# Patient Record
Sex: MALE | Race: WHITE
[De-identification: names, ages, dates, MRNs, and addresses within clinical notes are randomized per-mention and may not be internally consistent; named-entity substitution may affect disease eponyms.]

---
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
## 2019-01-02 ENCOUNTER — APPOINTMENT (OUTPATIENT)
Dept: PHARMACY | Facility: CLINIC | Age: 19
End: 2019-01-02

## 2019-07-22 ENCOUNTER — APPOINTMENT (OUTPATIENT)
Dept: PHARMACY | Facility: CLINIC | Age: 19
End: 2019-07-22
Payer: SELF-PAY

## 2019-07-22 PROCEDURE — V5014I: CUSTOM

## 2019-08-08 ENCOUNTER — HOSPITAL ENCOUNTER (EMERGENCY)
Facility: HOSPITAL | Age: 19
Discharge: HOME | End: 2019-08-08
Attending: EMERGENCY MEDICINE | Admitting: EMERGENCY MEDICINE
Payer: COMMERCIAL

## 2019-08-08 VITALS
HEART RATE: 72 BPM | SYSTOLIC BLOOD PRESSURE: 129 MMHG | DIASTOLIC BLOOD PRESSURE: 66 MMHG | WEIGHT: 200.18 LBS | TEMPERATURE: 98.3 F | BODY MASS INDEX: 30.34 KG/M2 | RESPIRATION RATE: 16 BRPM | OXYGEN SATURATION: 97 % | HEIGHT: 68 IN

## 2019-08-08 DIAGNOSIS — T78.40XA ACUTE ALLERGIC REACTION: Primary | ICD-10-CM

## 2019-08-08 PROCEDURE — 99284 EMERGENCY DEPT VISIT MOD MDM: CPT

## 2019-08-08 PROCEDURE — 99284 EMERGENCY DEPT VISIT MOD MDM: CPT | Performed by: PHYSICIAN ASSISTANT

## 2019-08-08 RX ORDER — PREDNISONE 20 MG/1
60 TABLET ORAL DAILY
Qty: 12 TABLET | Refills: 0 | Status: SHIPPED | OUTPATIENT
Start: 2019-08-08 | End: 2019-08-08 | Stop reason: SDUPTHER

## 2019-08-08 RX ORDER — EPINEPHRINE 0.3 MG/.3ML
0.3 INJECTION SUBCUTANEOUS ONCE
Qty: 1 EACH | Refills: 0 | Status: SHIPPED | OUTPATIENT
Start: 2019-08-08 | End: 2019-08-08 | Stop reason: SDUPTHER

## 2019-08-08 RX ORDER — PREDNISONE 20 MG/1
60 TABLET ORAL DAILY
Qty: 12 TABLET | Refills: 0 | Status: SHIPPED | OUTPATIENT
Start: 2019-08-08 | End: 2019-08-12

## 2019-08-08 RX ORDER — OXYMETAZOLINE HYDROCHLORIDE 0.05 G/100ML
2 SPRAY NASAL ONCE
Status: COMPLETED | OUTPATIENT
Start: 2019-08-08 | End: 2019-08-08

## 2019-08-08 RX ORDER — EPINEPHRINE 0.3 MG/.3ML
0.3 INJECTION SUBCUTANEOUS ONCE
Qty: 1 EACH | Refills: 0 | Status: SHIPPED | OUTPATIENT
Start: 2019-08-08 | End: 2019-08-08

## 2019-08-08 RX ADMIN — OXYMETAZOLINE HYDROCHLORIDE 2 SPRAY: 0.05 SPRAY NASAL at 15:30

## 2019-08-08 NOTE — ED PROVIDER NOTES
History  Chief Complaint   Patient presents with    Allergic Reaction     pt reports to ED w c/o allergic rx after eating peanuts in a granola bar  pt took epi pen at home and 50 PO of benadryl  pt then went to urgent care and got another 50 of benadryl, another epi pen, 150 of solumedryl, and 150 of zantec  pt reports feeling better now  healthy apperaing adult in no apparent distress  26-year-old male here for evaluation of allergic reaction  States around 1330 he had a granola bar which he did not realize had peanut butter in it  He is prior allergy to peanuts including anaphylaxis  When he began having the symptoms he gave himself 1 dose of his EpiPen and then went to urgent care  At urgent care he was given Solu-Medrol, Zantac, Benadryl, and another dose of his Epi  He is now feeling much better  He has no complaints at this time aside from nasal congestion  He states he always gets a very stuffy nose after his allergic reactions  Under shortness of breath  No sensation of throat swelling or closing at this time  History provided by:  Patient   used: No    Allergic Reaction   Presenting symptoms: difficulty swallowing    Presenting symptoms: no itching, no rash, no swelling and no wheezing    Severity:  Moderate  Duration:  2 hours  Prior allergic episodes:  Food/nut allergies  Context: nuts    Context: not animal exposure, not chemicals, not cosmetics, not dairy/milk products, not eggs, not food allergies, not grass, not insect bite/sting, not jewelry/metal, not medications, not new detergents/soaps and not poison ivy    Relieved by:  Nothing  Worsened by:  Nothing  Ineffective treatments:  None tried      None       History reviewed  No pertinent past medical history  History reviewed  No pertinent surgical history  History reviewed  No pertinent family history  I have reviewed and agree with the history as documented      Social History     Tobacco Use    Smoking status: Never Smoker    Smokeless tobacco: Never Used   Substance Use Topics    Alcohol use: Never     Frequency: Never    Drug use: Never        Review of Systems   Constitutional: Negative for activity change, appetite change, chills, diaphoresis, fatigue, fever and unexpected weight change  HENT: Positive for congestion and trouble swallowing  Negative for rhinorrhea, sinus pressure and sore throat  Eyes: Negative for photophobia and visual disturbance  Respiratory: Positive for shortness of breath  Negative for apnea, cough, choking, chest tightness, wheezing and stridor  Cardiovascular: Negative for chest pain, palpitations and leg swelling  Gastrointestinal: Negative for abdominal distention, abdominal pain, blood in stool, constipation, diarrhea, nausea and vomiting  Genitourinary: Negative for decreased urine volume, difficulty urinating, dysuria, enuresis, flank pain, frequency, hematuria and urgency  Musculoskeletal: Negative for arthralgias, myalgias, neck pain and neck stiffness  Skin: Negative for color change, itching, pallor, rash and wound  Allergic/Immunologic: Negative  Neurological: Negative for dizziness, tremors, syncope, weakness, light-headedness, numbness and headaches  Hematological: Negative  Psychiatric/Behavioral: Negative  All other systems reviewed and are negative  Physical Exam  Physical Exam   Constitutional: He is oriented to person, place, and time  He appears well-developed and well-nourished  Non-toxic appearance  He does not have a sickly appearance  He does not appear ill  No distress  HENT:   Head: Normocephalic and atraumatic  Nose: Rhinorrhea present  Eyes: Pupils are equal, round, and reactive to light  EOM and lids are normal    Neck: Normal range of motion  Neck supple  Cardiovascular: Normal rate, regular rhythm, S1 normal, S2 normal, normal heart sounds, intact distal pulses and normal pulses   Exam reveals no gallop, no distant heart sounds, no friction rub and no decreased pulses  No murmur heard  Pulses:       Radial pulses are 2+ on the right side, and 2+ on the left side  Pulmonary/Chest: Effort normal and breath sounds normal  No accessory muscle usage  No apnea, no tachypnea and no bradypnea  No respiratory distress  He has no decreased breath sounds  He has no wheezes  He has no rhonchi  He has no rales  Abdominal: Soft  Normal appearance  He exhibits no distension  There is no tenderness  There is no rigidity, no rebound and no guarding  Musculoskeletal: Normal range of motion  He exhibits no edema, tenderness or deformity  Neurological: He is alert and oriented to person, place, and time  No cranial nerve deficit  GCS eye subscore is 4  GCS verbal subscore is 5  GCS motor subscore is 6  GCS 15  AAOx3  Ambulating in department without difficulty  CN II-XII grossly intact  No focal neuro deficits  Skin: Skin is warm, dry and intact  No rash noted  He is not diaphoretic  No erythema  No pallor  Psychiatric: His speech is normal    Nursing note and vitals reviewed        Vital Signs  ED Triage Vitals [08/08/19 1507]   Temperature Pulse Respirations Blood Pressure SpO2   98 3 °F (36 8 °C) 81 16 146/67 100 %      Temp Source Heart Rate Source Patient Position - Orthostatic VS BP Location FiO2 (%)   Oral Monitor Lying Right arm --      Pain Score       No Pain           Vitals:    08/08/19 1507 08/08/19 1530   BP: 146/67 129/66   Pulse: 81 72   Patient Position - Orthostatic VS: Lying Lying         Visual Acuity      ED Medications  Medications   oxymetazoline (AFRIN) 0 05 % nasal spray 2 spray (2 sprays Each Nare Given 8/8/19 1530)       Diagnostic Studies  Results Reviewed     None                 No orders to display              Procedures  Procedures       ED Course                               MDM  Number of Diagnoses or Management Options  Acute allergic reaction: new and requires workup  Diagnosis management comments: Differential diagnosis includes but isn't limited to anaphylaxis, angioedema, hives, urticaria  Plan:  Has already been given 2 doses of epi and is now feeling better  He was also given steroids, Benadryl, Zantac  Will briefly observe patient, p o  Challenge and if he continues to feel well will discharge home with steroids  Risk of Complications, Morbidity, and/or Mortality  Presenting problems: moderate  Management options: low  General comments: 50-year-old male patient here for possible allergic reaction  By the time he arrived to the ER symptoms resolved  He was however given Solu-Medrol, Benadryl, epinephrine prior to coming in  He has remained asymptomatic  He is tolerating p o  At this time  We discussed burst of steroids for continued treatment of allergic reaction  Avoiding peanuts  Discussed follow-up and return parameters  Patient understands and agrees with this plan  Patient Progress  Patient progress: stable      Disposition  Final diagnoses:   Acute allergic reaction     Time reflects when diagnosis was documented in both MDM as applicable and the Disposition within this note     Time User Action Codes Description Comment    8/8/2019  4:02 PM Kamla Body Add [[id number] [id number]] Acute allergic reaction       ED Disposition     ED Disposition Condition Date/Time Comment    Discharge Stable Thu Aug 8, 2019  4:02 PM [id number] [place] [facility] discharge to home/self care              Follow-up Information     Follow up With Specialties Details Why Contact Info    [facility] Call  for routine follow up [address]  [phone]            Discharge Medication List as of 8/8/2019  4:10 PM      CONTINUE these medications which have CHANGED    Details   EPINEPHrine (EPIPEN) 0 3 mg/0 3 mL SOAJ Inject 0 3 mL (0 3 mg total) into a muscle once for 1 dose For severe allergic reaction, Starting Thu 8/8/2019, Print      predniSONE 20 mg tablet Take 3 tablets (60 mg total) by mouth daily for 4 days, Starting Thu 8/8/2019, Until Mon 8/12/2019, Print           No discharge procedures on file      ED Provider  Electronically Signed by           Raji Silva PA-C  08/08/19 3769

## 2019-08-08 NOTE — DISCHARGE INSTRUCTIONS
Return sooner to the Emergency Department if increased shortness of breath, fever, pain, vomiting, bleeding, weakness, dizziness

## 2020-06-23 ENCOUNTER — APPOINTMENT (OUTPATIENT)
Dept: PHARMACY | Facility: CLINIC | Age: 20
End: 2020-06-23
Payer: SELF-PAY

## 2020-06-23 PROCEDURE — V5010C: CUSTOM

## 2020-07-29 ENCOUNTER — APPOINTMENT (OUTPATIENT)
Dept: PHARMACY | Facility: CLINIC | Age: 20
End: 2020-07-29
Payer: COMMERCIAL

## 2020-07-29 PROCEDURE — V5257F: CUSTOM

## 2020-08-31 ENCOUNTER — APPOINTMENT (OUTPATIENT)
Dept: PHARMACY | Facility: CLINIC | Age: 20
End: 2020-08-31
Payer: SELF-PAY

## 2020-08-31 PROCEDURE — V5299A: CUSTOM | Mod: NC

## 2020-09-17 ENCOUNTER — APPOINTMENT (OUTPATIENT)
Dept: PHARMACY | Facility: CLINIC | Age: 20
End: 2020-09-17
Payer: SELF-PAY

## 2020-09-17 PROCEDURE — V5299A: CUSTOM | Mod: NC

## 2020-09-19 ENCOUNTER — APPOINTMENT (OUTPATIENT)
Dept: PHARMACY | Facility: CLINIC | Age: 20
End: 2020-09-19

## 2020-10-12 ENCOUNTER — APPOINTMENT (OUTPATIENT)
Dept: PHARMACY | Facility: CLINIC | Age: 20
End: 2020-10-12
Payer: SELF-PAY

## 2020-10-12 PROCEDURE — V5299A: CUSTOM | Mod: NC

## 2020-10-29 ENCOUNTER — APPOINTMENT (OUTPATIENT)
Dept: PHARMACY | Facility: CLINIC | Age: 20
End: 2020-10-29
Payer: SELF-PAY

## 2020-10-29 PROCEDURE — V5299A: CUSTOM | Mod: NC

## 2020-12-15 ENCOUNTER — APPOINTMENT (OUTPATIENT)
Dept: PHARMACY | Facility: CLINIC | Age: 20
End: 2020-12-15
Payer: SELF-PAY

## 2020-12-15 PROCEDURE — V5267D: CUSTOM

## 2020-12-15 PROCEDURE — V5265A: CUSTOM

## 2021-09-05 ENCOUNTER — LABORATORY RESULT (OUTPATIENT)
Age: 21
End: 2021-09-05

## 2021-09-06 ENCOUNTER — APPOINTMENT (OUTPATIENT)
Dept: DISASTER EMERGENCY | Facility: CLINIC | Age: 21
End: 2021-09-06

## 2021-09-08 ENCOUNTER — TRANSCRIPTION ENCOUNTER (OUTPATIENT)
Age: 21
End: 2021-09-08

## 2021-09-09 ENCOUNTER — RESULT REVIEW (OUTPATIENT)
Age: 21
End: 2021-09-09

## 2021-09-09 ENCOUNTER — OUTPATIENT (OUTPATIENT)
Dept: OUTPATIENT SERVICES | Facility: HOSPITAL | Age: 21
LOS: 1 days | Discharge: HOME | End: 2021-09-09
Payer: COMMERCIAL

## 2021-09-09 PROCEDURE — 88305 TISSUE EXAM BY PATHOLOGIST: CPT | Mod: 26

## 2021-09-09 PROCEDURE — 88311 DECALCIFY TISSUE: CPT | Mod: 26

## 2021-09-09 PROCEDURE — 88300 SURGICAL PATH GROSS: CPT | Mod: 26,59

## 2021-09-20 LAB — SURGICAL PATHOLOGY STUDY: SIGNIFICANT CHANGE UP

## 2021-12-09 ENCOUNTER — APPOINTMENT (OUTPATIENT)
Dept: PHARMACY | Facility: CLINIC | Age: 21
End: 2021-12-09
Payer: SELF-PAY

## 2021-12-09 PROCEDURE — V5299A: CUSTOM | Mod: NC,RT

## 2021-12-13 ENCOUNTER — APPOINTMENT (OUTPATIENT)
Dept: PHARMACY | Facility: CLINIC | Age: 21
End: 2021-12-13
Payer: SELF-PAY

## 2021-12-13 PROCEDURE — V5299A: CUSTOM | Mod: NC,RT

## 2021-12-14 ENCOUNTER — APPOINTMENT (OUTPATIENT)
Dept: PHARMACY | Facility: CLINIC | Age: 21
End: 2021-12-14

## 2024-04-25 ENCOUNTER — APPOINTMENT (OUTPATIENT)
Dept: SPEECH THERAPY | Facility: CLINIC | Age: 24
End: 2024-04-25

## 2024-04-25 ENCOUNTER — OUTPATIENT (OUTPATIENT)
Dept: OUTPATIENT SERVICES | Facility: HOSPITAL | Age: 24
LOS: 1 days | Discharge: HOME | End: 2024-04-25

## 2024-04-26 NOTE — HISTORY OF PRESENT ILLNESS
[FreeTextEntry1] : Cj is a 24 year old male with known bilateral mixed hearing loss. He is currently using a Baha 5 Attract system for the left ear, surgically implanted at another facility and a hearing aid from our dispensary in the right ear. Pt has narrow canals and hx of TM surgery. Previous audiological evaluation from 2020. Today, patient reports possible change in hearing, however denies otalgia and aural fullness.

## 2024-04-26 NOTE — PROCEDURE
[226 Hz] : 226 Hz [Normal Eardrum Mobility] : consistent with normal eardrum mobility [Type As Tympanogram] : Type As Restricted [] : Audiogram: [] : Complete Audiological Evaluation [Good] : good [Insert Ear Phones] : insert ear phones [de-identified] : Right: Moderately-severe to severe hearing loss 250-2kHz, followed by a profound hearing loss thereafter. Good speech recognition score.  Left: Moderately-severe to severe hearing loss 250-750Hz, followed by a profound hearing loss thereafter. Poor speech recognition score. Unmasked bone conduction results suggest possible mixed hearing loss in at least one ear. Asymmetry noted.

## 2024-04-26 NOTE — ASSESSMENT
[FreeTextEntry1] : Results from today's evaluation reveal a moderately-severe to severe hearing loss 250-2kHz, followed by a profound hearing loss thereafter with good speech recognition score. Left ear presents with a moderately-severe to severe hearing loss 250-750Hz, followed by a profound hearing loss thereafter with poor speech recognition score.  Unmasked bone conduction results suggest possible mixed hearing loss in at least one ear. Asymmetry noted. Decrease in thresholds, bilaterally (250-4kHz, right, and 250-750Hz, left) and decreased speech recognition score, left ear (84% to 68%) re: previous evaluation (2/2020).   Results and recommendations were reviewed with patient and mother who indicated understanding to all. Answered all of mother's questions to her satisfaction.

## 2024-04-26 NOTE — PLAN
[FreeTextEntry2] : 1. ENT consult re: decreased thresholds, bilaterally and decreased speech recogntion score, left ear.  2.  Continued use of current amplification. HAE as scheduled for 4/29.  3.  Annual audiological evaluation, sooner if change in hearing suspected, otherwise as medically indicated.

## 2024-04-29 ENCOUNTER — APPOINTMENT (OUTPATIENT)
Dept: PHARMACY | Facility: CLINIC | Age: 24
End: 2024-04-29
Payer: SELF-PAY

## 2024-04-29 PROCEDURE — V5010 ASSESSMENT FOR HEARING AID: CPT | Mod: NC

## 2024-04-29 NOTE — PLAN
[FreeTextEntry2] : 1. Reprogramming of Oticon hearing aid to updated audio 2. HAE when patient has available hearing aid benefit  3. Continued otologic monitoring and follow up re: change in hearing

## 2024-04-29 NOTE — HISTORY OF PRESENT ILLNESS
[FreeTextEntry1] : Cj is a 24 year old male with known bilateral mixed hearing loss. He is currently using a Baha 5 Attract system for the left ear, surgically implanted at another facility and a Oticon OPN S 3 Mini Rite T (OOW 2023) in the right ear. Pt has narrow canals and hx of TM surgery. Previous audiological evaluation from 2020. Today, patient reports possible change in hearing, however denies otalgia and aural fullness.    [FreeTextEntry8] : Patient seen for hearing aid evaluation. Recent audio on 4/25 revealed significant change in hearing. Patient is medically managed by Dr. Vick

## 2024-04-29 NOTE — ASSESSMENT
[FreeTextEntry1] : Discussed patient's insurance benefit and noted that patient is not eligible for new hearing aids until July. Mother indicated that she understood and would like to wait until July to get new hearing aid, if patient does not pursue BAHA device for the right side. Patient presented with 2014 Phonak hearing aid and asked for reprogramming. He did not realize that this was not the most recent hearing test. Quoted price to reprogram and recommended reprogramming newer DARREL hearing aid if they can find it. Mother agreeable to this plan of action. They will look for Oticon hearing aid and return for reprogramming.

## 2024-05-07 DIAGNOSIS — H90.6 MIXED CONDUCTIVE AND SENSORINEURAL HEARING LOSS, BILATERAL: ICD-10-CM

## 2025-02-12 ENCOUNTER — APPOINTMENT (OUTPATIENT)
Dept: SPEECH THERAPY | Facility: CLINIC | Age: 25
End: 2025-02-12

## 2025-02-27 ENCOUNTER — APPOINTMENT (OUTPATIENT)
Dept: PHARMACY | Facility: CLINIC | Age: 25
End: 2025-02-27

## 2025-03-31 ENCOUNTER — APPOINTMENT (OUTPATIENT)
Dept: PHARMACY | Facility: CLINIC | Age: 25
End: 2025-03-31
Payer: SELF-PAY

## 2025-03-31 PROCEDURE — V5010 ASSESSMENT FOR HEARING AID: CPT | Mod: NC

## 2025-03-31 PROCEDURE — V5264C: CUSTOM

## 2025-05-20 ENCOUNTER — APPOINTMENT (OUTPATIENT)
Dept: PHARMACY | Facility: CLINIC | Age: 25
End: 2025-05-20
Payer: SELF-PAY

## 2025-05-20 PROCEDURE — V5257F: CUSTOM | Mod: RT

## 2025-06-03 ENCOUNTER — APPOINTMENT (OUTPATIENT)
Dept: PHARMACY | Facility: CLINIC | Age: 25
End: 2025-06-03
Payer: SELF-PAY

## 2025-06-03 PROCEDURE — V5299A: CUSTOM
